# Patient Record
(demographics unavailable — no encounter records)

---

## 2025-03-11 NOTE — HISTORY OF PRESENT ILLNESS
[Patient reported mammogram was normal] : Patient reported mammogram was normal [Patient reported breast sonogram was normal] : Patient reported breast sonogram was normal [Patient reported PAP Smear was normal] : Patient reported PAP Smear was normal [Y] : Patient is sexually active [Hot Flashes] : hot flashes [Night Sweats] : night sweats [FreeTextEntry1] : GYN Annual [TextBox_4] : 58YO patient presents for GYN annual exam. s/p supracervical hysterectomy.  [Mammogramdate] : 2/1/25 [BreastSonogramDate] : 2/1/25 [PapSmeardate] : 8/2/2022 [HPVDate] : 8/2/22 [PGxTotal] : 6 [PGHxAbortions] : 1 [[facility] 4 [PGHxABSpont] : 1

## 2025-03-11 NOTE — PHYSICAL EXAM
[Chaperone Present] : A chaperone was present in the examining room during all aspects of the physical examination [Appropriately responsive] : appropriately responsive [Alert] : alert [No Acute Distress] : no acute distress [No Lymphadenopathy] : no lymphadenopathy [Soft] : soft [Non-tender] : non-tender [Non-distended] : non-distended [Oriented x3] : oriented x3 [Examination Of The Breasts] : a normal appearance [Breast Palpation Diffuse Fibrous Tissue Bilateral] : fibrocystic changes [No Discharge] : no discharge [No Masses] : no breast masses were palpable [Labia Majora] : normal [Labia Minora] : normal [No Bleeding] : There was no active vaginal bleeding [Normal] : normal [Absent] : absent [Uterine Adnexae] : normal [FreeTextEntry2] : JESUS Nair [FreeTextEntry6] : No tenderness, No nipple discharge and no adenopathy.

## 2025-03-11 NOTE — DISCUSSION/SUMMARY
[FreeTextEntry1] :  GYN Annual evaluation today -pap smear sent We discussed -- Fibrocystic breast  Patient verbalized understanding of all explanations, and her questions were answered, and all concerns were addressed. Patient was screened for depression - no signs of clinical depression. PHQ-2 on file Rx given for mammogram and B sonogram RTO in 1 year for annual   I, Tuan rosario acting as scribe for Dr. Escobedo. 03/05/2025   The documentation recorded by the scribe, in my presence, accurately reflects the service I personally performed, and the decisions made by me with my edits as appropriate on 03/11/2025  Lelia Escobedo MD, FACOG

## 2025-03-11 NOTE — HISTORY OF PRESENT ILLNESS
[Patient reported mammogram was normal] : Patient reported mammogram was normal [Patient reported breast sonogram was normal] : Patient reported breast sonogram was normal [Patient reported PAP Smear was normal] : Patient reported PAP Smear was normal [Y] : Patient is sexually active [Hot Flashes] : hot flashes [Night Sweats] : night sweats [FreeTextEntry1] : GYN Annual [TextBox_4] : 60YO patient presents for GYN annual exam. s/p supracervical hysterectomy.  [Mammogramdate] : 2/1/25 [BreastSonogramDate] : 2/1/25 [PapSmeardate] : 8/2/2022 [HPVDate] : 8/2/22 [PGxTotal] : 6 [PGHxAbortions] : 1 [[facility] : 4 [PGHxABSpont] : 1